# Patient Record
Sex: FEMALE | URBAN - METROPOLITAN AREA
[De-identification: names, ages, dates, MRNs, and addresses within clinical notes are randomized per-mention and may not be internally consistent; named-entity substitution may affect disease eponyms.]

---

## 2023-01-30 ENCOUNTER — TELEPHONE ENCOUNTER (OUTPATIENT)
Dept: URBAN - METROPOLITAN AREA CLINIC 113 | Facility: CLINIC | Age: 38
End: 2023-01-30

## 2024-09-27 ENCOUNTER — OFFICE VISIT (OUTPATIENT)
Dept: URBAN - METROPOLITAN AREA CLINIC 113 | Facility: CLINIC | Age: 39
End: 2024-09-27

## 2024-09-27 ENCOUNTER — DASHBOARD ENCOUNTERS (OUTPATIENT)
Age: 39
End: 2024-09-27

## 2024-09-27 ENCOUNTER — OFFICE VISIT (OUTPATIENT)
Dept: URBAN - METROPOLITAN AREA CLINIC 113 | Facility: CLINIC | Age: 39
End: 2024-09-27
Payer: COMMERCIAL

## 2024-09-27 ENCOUNTER — LAB OUTSIDE AN ENCOUNTER (OUTPATIENT)
Dept: URBAN - METROPOLITAN AREA CLINIC 113 | Facility: CLINIC | Age: 39
End: 2024-09-27

## 2024-09-27 VITALS
DIASTOLIC BLOOD PRESSURE: 90 MMHG | TEMPERATURE: 98.4 F | HEART RATE: 83 BPM | HEIGHT: 61 IN | RESPIRATION RATE: 18 BRPM | BODY MASS INDEX: 37.8 KG/M2 | SYSTOLIC BLOOD PRESSURE: 122 MMHG | WEIGHT: 200.2 LBS

## 2024-09-27 DIAGNOSIS — R11.2 NAUSEA AND VOMITING, UNSPECIFIED VOMITING TYPE: ICD-10-CM

## 2024-09-27 DIAGNOSIS — K92.0 HEMATEMESIS, UNSPECIFIED WHETHER NAUSEA PRESENT: ICD-10-CM

## 2024-09-27 DIAGNOSIS — K59.09 OTHER CONSTIPATION: ICD-10-CM

## 2024-09-27 DIAGNOSIS — R10.12 LEFT UPPER QUADRANT ABDOMINAL PAIN: ICD-10-CM

## 2024-09-27 PROBLEM — 14760008: Status: ACTIVE | Noted: 2024-09-27

## 2024-09-27 PROBLEM — 301715003: Status: ACTIVE | Noted: 2024-09-27

## 2024-09-27 PROBLEM — 16932000: Status: ACTIVE | Noted: 2024-09-27

## 2024-09-27 PROCEDURE — 99204 OFFICE O/P NEW MOD 45 MIN: CPT | Performed by: NURSE PRACTITIONER

## 2024-09-27 RX ORDER — DICYCLOMINE HYDROCHLORIDE 10 MG/1
1 TABLET CAPSULE ORAL
Qty: 90 | Refills: 3 | OUTPATIENT
Start: 2024-09-27 | End: 2025-01-24

## 2024-09-27 RX ORDER — HYDRALAZINE HYDROCHLORIDE 50 MG/1
1 TABLET WITH FOOD TABLET, FILM COATED ORAL TWICE A DAY
Status: ACTIVE | COMMUNITY

## 2024-09-27 RX ORDER — METFORMIN HYDROCHLORIDE 500 MG/1
1 TABLET WITH A MEAL TABLET, FILM COATED ORAL TWICE A DAY
Status: ACTIVE | COMMUNITY

## 2024-09-27 RX ORDER — ONDANSETRON 4 MG/1
1 TABLET ON THE TONGUE AND ALLOW TO DISSOLVE TABLET, ORALLY DISINTEGRATING ORAL
Qty: 30 | Refills: 2 | OUTPATIENT
Start: 2024-09-27

## 2024-09-27 RX ORDER — BUPROPION HYDROCHLORIDE 150 MG/1
1 TABLET IN THE MORNING TABLET, FILM COATED, EXTENDED RELEASE ORAL ONCE A DAY
Status: ACTIVE | COMMUNITY

## 2024-09-27 RX ORDER — PANTOPRAZOLE SODIUM 40 MG/1
1 TABLET TABLET, DELAYED RELEASE ORAL ONCE A DAY
Qty: 30 | Refills: 5 | OUTPATIENT
Start: 2024-09-27

## 2024-09-27 NOTE — HPI-OTHER HISTORIES
Labs 9/27/2024 CBC: WBC 7.1, hemoglobin 13.7, MCV 85.2, platelet 260. Urinalysis normal with exception of 1+ protein, 2+ ketones, 3+ urobilinogen, 6-10 casts, many mucus. BMP normal with exception of potassium 3.4, BUN 6. LFTs normal TB 0.7, ALP 48, AST 23, ALT 16. Lipase 31. Urine pregnancy test negative.  CT abdomen and pelvis with contrast 8/27/2024: No acute abdominopelvic findings. Significantly enlarged leiomyomatous uterus with multiple enhancing fibroids. Similar indeterminant hepatic dome hypodensity which again may reflect a hemangioma given recent ultrasound characteristics. Nonemergent MRI liver protocol could further characterize if clinically warranted.  Abdominal ultrasound 9/19/2024: Normal hepatic and gallbladder ultrasound.  Abdominal ultrasound 9/21/2024: No acute findings. Echogenic focus within the right hepatic lobe measuring 1.1 cm, likely representing hepatic hemangioma. Nonemergent outpatient liver protocol MRI could be obtained to further characterize if clinically warranted.  Labs  9/16/2024: CBC: WBC 6.7, hemoglobin 13, MCV 86.5, platelet 273. BMP normal with exception of sodium 135. LFTs normal TB 0.9, ALP 54, ALT 16, AST 23. Lipase 30. Hepatitis C nonreactive. Urinalysis notable for 1+ protein, 1+ ketones. Urine hCG negative.

## 2024-09-27 NOTE — HPI-TODAY'S VISIT:
This is a 39-year-old female with a history of hypertension, diabetes, and GERD referred from Dr. Isbell for chronic GERD complaining of nausea and vomiting.   She reports onset of nausea and vomiting 9/16/2024.  She reports intermittent symptoms since.  She has been maintaining a liquid diet and continues to have intermittent vomiting producing liquid or bile.  She is also vomiting after taking medication.  Today, she began to have cramps indicated in the left upper quadrant.  She also reports a sensation indicated in the epigastrium that feels as though her food is not digesting.  She reports frequent belching.  Nausea has been persistent.  Vomiting has been intermittent and occurring mostly at night.  During the first episode, she vomited streaks of blood.  She has lost 29 pounds per her report since onset of symptoms.  She had heartburn in the past for which she was prescribed omeprazole 2 years ago.  This resolved and has not recurred.  She denies dysphagia.  She typically has 1 or 2 bowel movements per day.  In the last week, she has experienced constipation.  She states she has not had a bowel movement in 7 days.  She denies a history of red blood per rectum or melena.  She reports 4 emergency department visits in the last week at [facility] or [facility].  Ondansetron has been helpful.  She was prescribed dicyclomine 20 mg for abdominal pain.  This is helpful but is causing sedation.  She is not on any injectable medication for weight loss or diabetes.  She went to the emergency department 9/16/2024 for nausea, vomiting, hematemesis, and periumbilical abdominal pain for 2 days.  A Salena-Jama tear was suspected.  Labs were unremarkable.  She had no further vomiting in the emergency department.  She was prescribed ondansetron. Record review includes the following; some obtained post visit.  Labs 9/27/2024 CBC: WBC 7.1, hemoglobin 13.7, MCV 85.2, platelet 260.  Urinalysis normal with exception of 1+ protein, 2+ ketones, 3+ urobilinogen, 6-10 casts, many mucus.  BMP normal with exception of potassium 3.4, BUN 6.  LFTs normal TB 0.7, ALP 48, AST 23, ALT 16.  Lipase 31.  Urine pregnancy test negative.  CT abdomen and pelvis with contrast 8/27/2024: No acute abdominopelvic findings.  Significantly enlarged leiomyomatous uterus with multiple enhancing fibroids.  Similar indeterminant hepatic dome hypodensity which again may reflect a hemangioma given recent ultrasound characteristics.  Nonemergent MRI liver protocol could further characterize if clinically warranted.  Abdominal ultrasound 9/19/2024: Normal hepatic and gallbladder ultrasound.  Abdominal ultrasound 9/21/2024: No acute findings.  Echogenic focus within the right hepatic lobe measuring 1.1 cm, likely representing hepatic hemangioma.  Nonemergent outpatient liver protocol MRI could be obtained to further characterize if clinically warranted.  Labs 9/16/2024: CBC: WBC 6.7, hemoglobin 13, MCV 86.5, platelet 273.  BMP normal with exception of sodium 135.  LFTs normal TB 0.9, ALP 54, ALT 16, AST 23.  Lipase 30.  Hepatitis C nonreactive.  Urinalysis notable for 1+ protein, 1+ ketones.  Urine hCG negative.

## 2024-09-29 ENCOUNTER — TELEPHONE ENCOUNTER (OUTPATIENT)
Dept: URBAN - METROPOLITAN AREA SURGERY CENTER 25 | Facility: SURGERY CENTER | Age: 39
End: 2024-09-29

## 2024-09-30 ENCOUNTER — OFFICE VISIT (OUTPATIENT)
Dept: URBAN - METROPOLITAN AREA CLINIC 113 | Facility: CLINIC | Age: 39
End: 2024-09-30

## 2024-09-30 ENCOUNTER — OFFICE VISIT (OUTPATIENT)
Dept: URBAN - METROPOLITAN AREA SURGERY CENTER 25 | Facility: SURGERY CENTER | Age: 39
End: 2024-09-30

## 2024-10-10 ENCOUNTER — CLAIMS CREATED FROM THE CLAIM WINDOW (OUTPATIENT)
Dept: URBAN - METROPOLITAN AREA CLINIC 4 | Facility: CLINIC | Age: 39
End: 2024-10-10
Payer: COMMERCIAL

## 2024-10-10 ENCOUNTER — OFFICE VISIT (OUTPATIENT)
Dept: URBAN - METROPOLITAN AREA SURGERY CENTER 25 | Facility: SURGERY CENTER | Age: 39
End: 2024-10-10
Payer: COMMERCIAL

## 2024-10-10 ENCOUNTER — OFFICE VISIT (OUTPATIENT)
Dept: URBAN - METROPOLITAN AREA CLINIC 107 | Facility: CLINIC | Age: 39
End: 2024-10-10

## 2024-10-10 DIAGNOSIS — K44.9 HIATAL HERNIA: ICD-10-CM

## 2024-10-10 DIAGNOSIS — R11.2 ACUTE NAUSEA WITH NONBILIOUS VOMITING: ICD-10-CM

## 2024-10-10 DIAGNOSIS — K29.00 ACUTE GASTRITIS WITHOUT BLEEDING: ICD-10-CM

## 2024-10-10 DIAGNOSIS — K29.60 ADENOPAPILLOMATOSIS GASTRICA: ICD-10-CM

## 2024-10-10 DIAGNOSIS — R10.13 ABDOMINAL DISCOMFORT, EPIGASTRIC: ICD-10-CM

## 2024-10-10 DIAGNOSIS — R11.2 NAUSEA WITH VOMITING, UNSPECIFIED: ICD-10-CM

## 2024-10-10 DIAGNOSIS — K29.60 OTHER GASTRITIS WITHOUT BLEEDING: ICD-10-CM

## 2024-10-10 DIAGNOSIS — R10.13 EPIGASTRIC PAIN: ICD-10-CM

## 2024-10-10 PROCEDURE — 43239 EGD BIOPSY SINGLE/MULTIPLE: CPT | Performed by: INTERNAL MEDICINE

## 2024-10-10 PROCEDURE — 00731 ANES UPR GI NDSC PX NOS: CPT | Performed by: NURSE ANESTHETIST, CERTIFIED REGISTERED

## 2024-10-10 PROCEDURE — 88305 TISSUE EXAM BY PATHOLOGIST: CPT | Performed by: PATHOLOGY

## 2024-10-10 PROCEDURE — 88341 IMHCHEM/IMCYTCHM EA ADD ANTB: CPT | Performed by: PATHOLOGY

## 2024-10-10 PROCEDURE — 88342 IMHCHEM/IMCYTCHM 1ST ANTB: CPT | Performed by: PATHOLOGY

## 2024-10-10 RX ORDER — BUPROPION HYDROCHLORIDE 150 MG/1
1 TABLET IN THE MORNING TABLET, FILM COATED, EXTENDED RELEASE ORAL ONCE A DAY
Status: ACTIVE | COMMUNITY

## 2024-10-10 RX ORDER — ONDANSETRON 4 MG/1
1 TABLET ON THE TONGUE AND ALLOW TO DISSOLVE TABLET, ORALLY DISINTEGRATING ORAL
Qty: 30 | Refills: 2 | Status: ACTIVE | COMMUNITY
Start: 2024-09-27

## 2024-10-10 RX ORDER — METFORMIN HYDROCHLORIDE 500 MG/1
1 TABLET WITH A MEAL TABLET, FILM COATED ORAL TWICE A DAY
Status: ACTIVE | COMMUNITY

## 2024-10-10 RX ORDER — DICYCLOMINE HYDROCHLORIDE 10 MG/1
1 TABLET CAPSULE ORAL
Qty: 90 | Refills: 3 | Status: ACTIVE | COMMUNITY
Start: 2024-09-27 | End: 2025-01-24

## 2024-10-10 RX ORDER — HYDRALAZINE HYDROCHLORIDE 50 MG/1
1 TABLET WITH FOOD TABLET, FILM COATED ORAL TWICE A DAY
Status: ACTIVE | COMMUNITY

## 2024-10-10 RX ORDER — PANTOPRAZOLE SODIUM 40 MG/1
1 TABLET TABLET, DELAYED RELEASE ORAL ONCE A DAY
Qty: 30 | Refills: 5 | Status: ACTIVE | COMMUNITY
Start: 2024-09-27

## 2024-10-15 ENCOUNTER — OFFICE VISIT (OUTPATIENT)
Dept: URBAN - METROPOLITAN AREA CLINIC 107 | Facility: CLINIC | Age: 39
End: 2024-10-15

## 2024-10-15 NOTE — HPI-TODAY'S VISIT:
This is a 39-year-old female with a history of hypertension, diabetes, GERD, and nausea and vomiting presenting for follow-up.  She was initially seen 9/27/2024.  She reported onset of nausea and vomiting on 9/16/2024.  She reported an episode of hematemesis and recent onset of left upper quadrant abdominal pain.  Hematemesis was suspected to be associated with a Salena-Jama tear.  She had undergone extensive evaluation in the emergency departments that included labs, ultrasound, and CT that did not reveal a source.  An acid peptic disorder, functional dyspepsia, and gastroparesis were within the differential.  It was discussed that left upper quadrant pain may be spasm associated with constipation.  She was to begin a trial of daily pantoprazole.  She was to continue ondansetron and dicyclomine for symptomatic relief of nausea and abdominal pain.  An EGD was recommended to assess for gastroduodenal pathology.  Regarding constipation.  She had been having regular bowel movements until the week prior to her visit.  Constipation associated with medication side effect (ondansetron and possible pain medication in the ER).  She was to take magnesium citrate 1 bottle the day of her visit followed by MiraLAX 1 capful twice a day.  If stools became too loose, she was to reduce to once a day and continue to titrate to desired effect. Depressed/Anxious

## 2024-10-29 ENCOUNTER — OFFICE VISIT (OUTPATIENT)
Dept: URBAN - METROPOLITAN AREA CLINIC 113 | Facility: CLINIC | Age: 39
End: 2024-10-29

## 2024-11-13 ENCOUNTER — OFFICE VISIT (OUTPATIENT)
Dept: URBAN - METROPOLITAN AREA CLINIC 113 | Facility: CLINIC | Age: 39
End: 2024-11-13
Payer: COMMERCIAL

## 2024-11-13 VITALS
HEART RATE: 81 BPM | TEMPERATURE: 97.5 F | BODY MASS INDEX: 40.97 KG/M2 | DIASTOLIC BLOOD PRESSURE: 100 MMHG | HEIGHT: 61 IN | WEIGHT: 217 LBS | SYSTOLIC BLOOD PRESSURE: 160 MMHG

## 2024-11-13 DIAGNOSIS — R10.12 LEFT UPPER QUADRANT ABDOMINAL PAIN: ICD-10-CM

## 2024-11-13 DIAGNOSIS — R11.2 NAUSEA AND VOMITING, UNSPECIFIED VOMITING TYPE: ICD-10-CM

## 2024-11-13 DIAGNOSIS — K21.9 GERD WITHOUT ESOPHAGITIS: ICD-10-CM

## 2024-11-13 DIAGNOSIS — K29.50 CHRONIC GASTRITIS WITHOUT BLEEDING, UNSPECIFIED GASTRITIS TYPE: ICD-10-CM

## 2024-11-13 PROBLEM — 8493009: Status: ACTIVE | Noted: 2024-11-13

## 2024-11-13 PROBLEM — 266435005: Status: ACTIVE | Noted: 2024-11-13

## 2024-11-13 PROCEDURE — 99213 OFFICE O/P EST LOW 20 MIN: CPT | Performed by: NURSE PRACTITIONER

## 2024-11-13 RX ORDER — PANTOPRAZOLE SODIUM 40 MG/1
1 TABLET TABLET, DELAYED RELEASE ORAL ONCE A DAY
Qty: 30 | Refills: 5 | Status: ACTIVE | COMMUNITY
Start: 2024-09-27

## 2024-11-13 RX ORDER — ONDANSETRON 4 MG/1
1 TABLET ON THE TONGUE AND ALLOW TO DISSOLVE TABLET, ORALLY DISINTEGRATING ORAL
Qty: 30 | Refills: 2 | Status: ACTIVE | COMMUNITY
Start: 2024-09-27

## 2024-11-13 RX ORDER — DICYCLOMINE HYDROCHLORIDE 10 MG/1
1 TABLET CAPSULE ORAL
Qty: 90 | Refills: 3 | Status: ACTIVE | COMMUNITY
Start: 2024-09-27 | End: 2025-01-24

## 2024-11-13 RX ORDER — METFORMIN HYDROCHLORIDE 500 MG/1
1 TABLET WITH A MEAL TABLET, FILM COATED ORAL TWICE A DAY
Status: ACTIVE | COMMUNITY

## 2024-11-13 RX ORDER — HYDRALAZINE HYDROCHLORIDE 50 MG/1
1 TABLET WITH FOOD TABLET, FILM COATED ORAL TWICE A DAY
Status: ACTIVE | COMMUNITY

## 2024-11-13 RX ORDER — BUPROPION HYDROCHLORIDE 150 MG/1
1 TABLET IN THE MORNING TABLET, FILM COATED, EXTENDED RELEASE ORAL ONCE A DAY
Status: ACTIVE | COMMUNITY

## 2024-11-13 NOTE — HPI-TODAY'S VISIT:
This is a 39-year-old female with a history of hypertension, diabetes, GERD, and nausea and vomiting presenting for follow-up.  She was initially seen 9/27/2024.  She reported onset of nausea and vomiting on 9/16/2024.  She reported an episode of hematemesis and recent onset of left upper quadrant abdominal pain.  Hematemesis was suspected to be associated with a Salena-Jama tear.  She had undergone extensive evaluation in the emergency departments that included labs, ultrasound, and CT that did not reveal a source.  An acid peptic disorder, functional dyspepsia, and gastroparesis were within the differential.  It was discussed that left upper quadrant pain may be spasm associated with constipation.  She was to begin a trial of daily pantoprazole.  She was to continue ondansetron and dicyclomine for symptomatic relief of nausea and abdominal pain.  An EGD was recommended to assess for gastroduodenal pathology.  Regarding constipation.  She had been having regular bowel movements until the week prior to her visit.  Constipation associated with medication side effect (ondansetron and possible pain medication in the ER).  She was to take magnesium citrate 1 bottle the day of her visit followed by MiraLAX 1 capful twice a day.  If stools became too loose, she was to reduce to once a day and continue to titrate to desired effect.

## 2024-11-13 NOTE — HPI-TODAY'S VISIT:
She is taking pantoprazole 40 mg daily.  She denies acid reflux.  She exhausted her supply of medication.  While she was off therapy, she had heartburn.  This has resolved.  She has had no recurrence of abdominal pain, nausea, or vomiting.  She has dicyclomine and ondansetron on hand should she require it.

## 2025-05-12 ENCOUNTER — OFFICE VISIT (OUTPATIENT)
Dept: URBAN - METROPOLITAN AREA CLINIC 113 | Facility: CLINIC | Age: 40
End: 2025-05-12

## 2025-05-16 ENCOUNTER — TELEPHONE ENCOUNTER (OUTPATIENT)
Dept: URBAN - METROPOLITAN AREA CLINIC 113 | Facility: CLINIC | Age: 40
End: 2025-05-16

## 2025-05-16 RX ORDER — PANTOPRAZOLE SODIUM 40 MG/1
1 TABLET TABLET, DELAYED RELEASE ORAL ONCE A DAY
Qty: 30 | Refills: 5
Start: 2024-09-27